# Patient Record
Sex: FEMALE | Race: WHITE
[De-identification: names, ages, dates, MRNs, and addresses within clinical notes are randomized per-mention and may not be internally consistent; named-entity substitution may affect disease eponyms.]

---

## 2021-07-27 ENCOUNTER — HOSPITAL ENCOUNTER (OUTPATIENT)
Dept: HOSPITAL 47 - RADPROMAIN | Age: 81
Discharge: HOME | End: 2021-07-27
Attending: INTERNAL MEDICINE
Payer: MEDICARE

## 2021-07-27 VITALS — TEMPERATURE: 98 F

## 2021-07-27 VITALS — HEART RATE: 76 BPM | SYSTOLIC BLOOD PRESSURE: 114 MMHG | DIASTOLIC BLOOD PRESSURE: 57 MMHG

## 2021-07-27 VITALS — RESPIRATION RATE: 16 BRPM

## 2021-07-27 DIAGNOSIS — J44.9: Primary | ICD-10-CM

## 2021-07-27 DIAGNOSIS — F17.200: ICD-10-CM

## 2021-07-27 LAB
INR PPP: 1 (ref ?–1.2)
PLATELET # BLD AUTO: 470 K/UL (ref 150–450)
PT BLD: 11 SEC (ref 9–12)

## 2021-07-27 PROCEDURE — 36415 COLL VENOUS BLD VENIPUNCTURE: CPT

## 2021-07-27 PROCEDURE — 88305 TISSUE EXAM BY PATHOLOGIST: CPT

## 2021-07-27 PROCEDURE — 85049 AUTOMATED PLATELET COUNT: CPT

## 2021-07-27 PROCEDURE — 32408 CORE NDL BX LNG/MED PERQ: CPT

## 2021-07-27 PROCEDURE — 85610 PROTHROMBIN TIME: CPT

## 2021-07-27 PROCEDURE — 88341 IMHCHEM/IMCYTCHM EA ADD ANTB: CPT

## 2021-07-27 PROCEDURE — 88342 IMHCHEM/IMCYTCHM 1ST ANTB: CPT

## 2021-07-27 PROCEDURE — 77012 CT SCAN FOR NEEDLE BIOPSY: CPT

## 2021-07-27 PROCEDURE — 71045 X-RAY EXAM CHEST 1 VIEW: CPT

## 2021-07-27 NOTE — XR
EXAMINATION TYPE: XR chest 1V portable

 

DATE OF EXAM: 7/27/2021

 

COMPARISON: 7/27/2021

 

HISTORY: Post lung biopsy

 

TECHNIQUE: Single frontal view of the chest is obtained.

 

FINDINGS:  Left-sided consolidation, mass pleural effusion stable. No sizable pneumothorax. Underlyin
g COPD noted. Diffuse osteopenia. Atherosclerotic change aorta. Calcified granuloma right upper lobe 
and calcified nodule right suprahilar region. Stable apical pleural thickening or mass.

 

IMPRESSION:  No pneumothorax post lung biopsy

## 2021-07-27 NOTE — XR
EXAMINATION TYPE: XR chest 1V portable

 

DATE OF EXAM: 7/27/2021

 

HISTORY: Status post left lung biopsy

 

COMPARISON: None.

 

TECHNIQUE: Single view of the chest is submitted.

 

FINDINGS:

No evidence for pneumothorax. Left perihilar and left lower lobe mass with postobstructive the infilt
rate and moderate pleural effusion.

 

The heart is stable.

 

 

Degenerative changes are seen of the dorsal spine. 

 

 IMPRESSION: 

 

1.  No evidence for pneumothorax in a patient who is status post left lung biopsy.

## 2021-07-27 NOTE — CT
EXAMINATION TYPE: CT biopsy lung LT

 

DATE OF EXAM: 7/27/2021

 

COMPARISON: CT chest 6/4/2021

 

HISTORY: Left  lung Bx

 

The procedure is discussed with the patient, the risks, complications, benefits and alternatives, wer
e discussed and any questions were answered.  Informed consent was obtained.  The patient is placed p
nikki on the CT table, prepped and draped in the usual sterile fashion.  

 

Utilizing a 18-gauge core biopsy needle access into the left lung mass was achieved with a single pas
s performed. Pathology pending.  All elements of maximal barrier technique were utilized.  The patien
t remained stable throughout the procedure with no immediate postprocedural complication.  

 

IMPRESSION:

 

1.   Successful CT guided biopsy left lung mass